# Patient Record
Sex: FEMALE | Race: WHITE | NOT HISPANIC OR LATINO | Employment: OTHER | URBAN - METROPOLITAN AREA
[De-identification: names, ages, dates, MRNs, and addresses within clinical notes are randomized per-mention and may not be internally consistent; named-entity substitution may affect disease eponyms.]

---

## 2017-01-11 ENCOUNTER — HISTORICAL (OUTPATIENT)
Dept: LAB | Facility: HOSPITAL | Age: 77
End: 2017-01-11

## 2017-03-03 ENCOUNTER — HISTORICAL (OUTPATIENT)
Dept: LAB | Facility: HOSPITAL | Age: 77
End: 2017-03-03

## 2017-04-03 ENCOUNTER — HISTORICAL (OUTPATIENT)
Dept: LAB | Facility: HOSPITAL | Age: 77
End: 2017-04-03

## 2017-05-09 ENCOUNTER — HISTORICAL (OUTPATIENT)
Dept: LAB | Facility: HOSPITAL | Age: 77
End: 2017-05-09

## 2017-05-09 LAB
ABS NEUT (OLG): 5.04
ALBUMIN SERPL-MCNC: 3.6 GM/DL (ref 3.4–5)
ALBUMIN/GLOB SERPL: 1 RATIO (ref 1.1–2)
ALP SERPL-CCNC: 83 UNIT/L (ref 50–136)
ALT SERPL-CCNC: 26 UNIT/L (ref 12–78)
AST SERPL-CCNC: 19 UNIT/L (ref 10–37)
BASOPHILS # BLD AUTO: 0.02 X10(3)/MCL
BASOPHILS NFR BLD AUTO: 0.2 %
BILIRUB SERPL-MCNC: 0.4 MG/DL (ref 0.2–1)
BILIRUBIN DIRECT+TOT PNL SERPL-MCNC: 0.14 MG/DL (ref 0.05–0.2)
BILIRUBIN DIRECT+TOT PNL SERPL-MCNC: 0.26 MG/DL
BUN SERPL-MCNC: 20 MG/DL (ref 7–18)
CALCIUM SERPL-MCNC: 9.5 MG/DL (ref 8.5–10.1)
CHLORIDE SERPL-SCNC: 103 MMOL/L (ref 98–107)
CK SERPL-CCNC: 62 MG/DL (ref 26–308)
CO2 SERPL-SCNC: 31.1 MMOL/L (ref 21–32)
CREAT SERPL-MCNC: 0.83 MG/DL (ref 0.55–1.02)
EOSINOPHIL # BLD AUTO: 0.16 X10(3)/MCL
EOSINOPHIL NFR BLD AUTO: 1.9 %
ERYTHROCYTE [DISTWIDTH] IN BLOOD BY AUTOMATED COUNT: 15 %
GLOBULIN SER-MCNC: 3.6 GM/DL (ref 2.4–3.5)
GLUCOSE SERPL-MCNC: 95 MG/DL (ref 74–106)
HCT VFR BLD AUTO: 45.2 % (ref 34–46)
HGB BLD-MCNC: 14.8 GM/DL (ref 11.3–15.4)
IMM GRANULOCYTES # BLD AUTO: 0.02 10*3/UL (ref 0–0.1)
IMM GRANULOCYTES NFR BLD AUTO: 0.2 % (ref 0–1)
LYMPHOCYTES # BLD AUTO: 2.45 X10(3)/MCL
LYMPHOCYTES NFR BLD AUTO: 28.6 %
MCH RBC QN AUTO: 31.7 PG (ref 27–33)
MCHC RBC AUTO-ENTMCNC: 32.7 GM/DL (ref 32–35)
MCV RBC AUTO: 96.8 FL (ref 81–97)
MONOCYTES # BLD AUTO: 0.87 X10(3)/MCL
MONOCYTES NFR BLD AUTO: 10.2 %
NEUTROPHILS # BLD AUTO: 5.04 X10(3)/MCL
NEUTROPHILS NFR BLD AUTO: 58.9 %
PLATELET # BLD AUTO: 368 X10(3)/MCL (ref 151–368)
PMV BLD AUTO: 9 FL
POTASSIUM SERPL-SCNC: 5.4 MMOL/L (ref 3.5–5.1)
PROT SERPL-MCNC: 7.2 GM/DL (ref 6.4–8.2)
RBC # BLD AUTO: 4.67 X10(6)/MCL (ref 3.9–5)
SODIUM SERPL-SCNC: 138 MMOL/L (ref 136–145)
WBC # SPEC AUTO: 8.56 X10(3)/MCL (ref 3.4–9.2)

## 2017-05-18 ENCOUNTER — HISTORICAL (OUTPATIENT)
Dept: LAB | Facility: HOSPITAL | Age: 77
End: 2017-05-18

## 2017-05-18 LAB
ABS NEUT (OLG): 6.13
ALBUMIN SERPL-MCNC: 3.8 GM/DL (ref 3.4–5)
ALBUMIN/GLOB SERPL: 1 RATIO (ref 1.1–2)
ALP SERPL-CCNC: 92 UNIT/L (ref 50–136)
ALT SERPL-CCNC: 22 UNIT/L (ref 12–78)
AST SERPL-CCNC: 19 UNIT/L (ref 10–37)
BASOPHILS # BLD AUTO: 0.02 X10(3)/MCL
BASOPHILS NFR BLD AUTO: 0.2 %
BILIRUB SERPL-MCNC: 0.6 MG/DL (ref 0.2–1)
BILIRUBIN DIRECT+TOT PNL SERPL-MCNC: 0.17 MG/DL (ref 0.05–0.2)
BILIRUBIN DIRECT+TOT PNL SERPL-MCNC: 0.43 MG/DL
BUN SERPL-MCNC: 16 MG/DL (ref 7–18)
CALCIUM SERPL-MCNC: 9.2 MG/DL (ref 8.5–10.1)
CHLORIDE SERPL-SCNC: 103 MMOL/L (ref 98–107)
CO2 SERPL-SCNC: 29.2 MMOL/L (ref 21–32)
CREAT SERPL-MCNC: 0.81 MG/DL (ref 0.55–1.02)
EOSINOPHIL # BLD AUTO: 0.11 X10(3)/MCL
EOSINOPHIL NFR BLD AUTO: 1.2 %
ERYTHROCYTE [DISTWIDTH] IN BLOOD BY AUTOMATED COUNT: 15 %
EST. AVERAGE GLUCOSE BLD GHB EST-MCNC: 123 MG/DL
GLOBULIN SER-MCNC: 3.9 GM/DL (ref 2.4–3.5)
GLUCOSE SERPL-MCNC: 109 MG/DL (ref 74–106)
HBA1C MFR BLD: 5.9 % (ref 4.5–6.2)
HCT VFR BLD AUTO: 46.2 % (ref 34–46)
HGB BLD-MCNC: 15 GM/DL (ref 11.3–15.4)
IMM GRANULOCYTES # BLD AUTO: 0.02 10*3/UL (ref 0–0.1)
IMM GRANULOCYTES NFR BLD AUTO: 0.2 % (ref 0–1)
LYMPHOCYTES # BLD AUTO: 2.06 X10(3)/MCL
LYMPHOCYTES NFR BLD AUTO: 22.6 %
MCH RBC QN AUTO: 31.4 PG (ref 27–33)
MCHC RBC AUTO-ENTMCNC: 32.5 GM/DL (ref 32–35)
MCV RBC AUTO: 96.7 FL (ref 81–97)
MONOCYTES # BLD AUTO: 0.78 X10(3)/MCL
MONOCYTES NFR BLD AUTO: 8.6 %
NEUTROPHILS # BLD AUTO: 6.13 X10(3)/MCL
NEUTROPHILS NFR BLD AUTO: 67.2 %
PLATELET # BLD AUTO: 363 X10(3)/MCL (ref 151–368)
PMV BLD AUTO: 9 FL
POTASSIUM SERPL-SCNC: 4.3 MMOL/L (ref 3.5–5.1)
PROT SERPL-MCNC: 7.7 GM/DL (ref 6.4–8.2)
RBC # BLD AUTO: 4.78 X10(6)/MCL (ref 3.9–5)
SODIUM SERPL-SCNC: 139 MMOL/L (ref 136–145)
TSH SERPL-ACNC: 1.72 MIU/ML (ref 0.35–3.75)
WBC # SPEC AUTO: 9.12 X10(3)/MCL (ref 3.4–9.2)

## 2017-10-23 ENCOUNTER — HISTORICAL (OUTPATIENT)
Dept: LAB | Facility: HOSPITAL | Age: 77
End: 2017-10-23

## 2017-10-23 LAB
EST. AVERAGE GLUCOSE BLD GHB EST-MCNC: 117 MG/DL
HBA1C MFR BLD: 5.7 % (ref 4.5–6.2)
T3FREE SERPL-MCNC: 3.34 PG/ML (ref 2.18–3.98)
T4 FREE SERPL-MCNC: 1.09 NG/DL (ref 0.76–1.46)
TSH SERPL-ACNC: 0.9 MIU/ML (ref 0.35–3.75)

## 2017-11-06 ENCOUNTER — HISTORICAL (OUTPATIENT)
Dept: LAB | Facility: HOSPITAL | Age: 77
End: 2017-11-06

## 2017-11-06 LAB
ABS NEUT (OLG): 6.03
ALBUMIN SERPL-MCNC: 3.4 GM/DL (ref 3.4–5)
ALBUMIN/GLOB SERPL: 0.9 RATIO (ref 1.1–2)
ALP SERPL-CCNC: 82 UNIT/L (ref 50–136)
ALT SERPL-CCNC: 22 UNIT/L (ref 12–78)
AST SERPL-CCNC: 15 UNIT/L (ref 10–37)
BASOPHILS # BLD AUTO: 0.02 X10(3)/MCL
BASOPHILS NFR BLD AUTO: 0.2 %
BILIRUB SERPL-MCNC: 0.2 MG/DL (ref 0.2–1)
BILIRUBIN DIRECT+TOT PNL SERPL-MCNC: 0.07 MG/DL (ref 0.05–0.2)
BILIRUBIN DIRECT+TOT PNL SERPL-MCNC: 0.18 MG/DL
BUN SERPL-MCNC: 20 MG/DL (ref 7–18)
CALCIUM SERPL-MCNC: 8.7 MG/DL (ref 8.5–10.1)
CHLORIDE SERPL-SCNC: 101 MMOL/L (ref 98–107)
CO2 SERPL-SCNC: 28.3 MMOL/L (ref 21–32)
CREAT SERPL-MCNC: 0.79 MG/DL (ref 0.55–1.02)
EOSINOPHIL # BLD AUTO: 0.19 X10(3)/MCL
EOSINOPHIL NFR BLD AUTO: 1.9 %
ERYTHROCYTE [DISTWIDTH] IN BLOOD BY AUTOMATED COUNT: 14 %
GLOBULIN SER-MCNC: 3.6 GM/DL (ref 2.4–3.5)
GLUCOSE SERPL-MCNC: 93 MG/DL (ref 74–106)
HCT VFR BLD AUTO: 42.1 % (ref 34–46)
HGB BLD-MCNC: 14 GM/DL (ref 11.3–15.4)
IMM GRANULOCYTES # BLD AUTO: 0.01 10*3/UL (ref 0–0.1)
IMM GRANULOCYTES NFR BLD AUTO: 0.1 % (ref 0–1)
LYMPHOCYTES # BLD AUTO: 2.72 X10(3)/MCL
LYMPHOCYTES NFR BLD AUTO: 27.1 %
MCH RBC QN AUTO: 31.7 PG (ref 27–33)
MCHC RBC AUTO-ENTMCNC: 33.3 GM/DL (ref 32–35)
MCV RBC AUTO: 95.2 FL (ref 81–97)
MONOCYTES # BLD AUTO: 1.05 X10(3)/MCL
MONOCYTES NFR BLD AUTO: 10.5 %
NEUTROPHILS # BLD AUTO: 6.03 X10(3)/MCL
NEUTROPHILS NFR BLD AUTO: 60.2 %
PLATELET # BLD AUTO: 369 X10(3)/MCL (ref 151–368)
PMV BLD AUTO: 9 FL
POTASSIUM SERPL-SCNC: 4.1 MMOL/L (ref 3.5–5.1)
PROT SERPL-MCNC: 7 GM/DL (ref 6.4–8.2)
RBC # BLD AUTO: 4.42 X10(6)/MCL (ref 3.9–5)
SODIUM SERPL-SCNC: 137 MMOL/L (ref 136–145)
WBC # SPEC AUTO: 10.02 X10(3)/MCL (ref 3.4–9.2)

## 2018-03-26 ENCOUNTER — HISTORICAL (OUTPATIENT)
Dept: LAB | Facility: HOSPITAL | Age: 78
End: 2018-03-26

## 2018-03-26 LAB
ABS NEUT (OLG): 5.59
ALBUMIN SERPL-MCNC: 3.6 GM/DL (ref 3.4–5)
ALBUMIN/GLOB SERPL: 0.9 RATIO (ref 1.1–2)
ALP SERPL-CCNC: 83 UNIT/L (ref 46–116)
ALT SERPL-CCNC: 25 UNIT/L (ref 12–78)
AST SERPL-CCNC: 17 UNIT/L (ref 10–37)
BASOPHILS # BLD AUTO: 0.02 X10(3)/MCL
BASOPHILS NFR BLD AUTO: 0.2 %
BILIRUB SERPL-MCNC: 0.5 MG/DL (ref 0.2–1)
BILIRUBIN DIRECT+TOT PNL SERPL-MCNC: 0.14 MG/DL (ref 0–0.2)
BILIRUBIN DIRECT+TOT PNL SERPL-MCNC: 0.36 MG/DL
BUN SERPL-MCNC: 20 MG/DL (ref 7–18)
CALCIUM SERPL-MCNC: 9.1 MG/DL (ref 8.5–10.1)
CHLORIDE SERPL-SCNC: 100 MMOL/L (ref 98–107)
CO2 SERPL-SCNC: 28.8 MMOL/L (ref 21–32)
CREAT SERPL-MCNC: 0.73 MG/DL (ref 0.55–1.02)
EOSINOPHIL # BLD AUTO: 0.13 X10(3)/MCL
EOSINOPHIL NFR BLD AUTO: 1.5 %
ERYTHROCYTE [DISTWIDTH] IN BLOOD BY AUTOMATED COUNT: 14 %
GLOBULIN SER-MCNC: 3.8 GM/DL (ref 2.4–3.5)
GLUCOSE SERPL-MCNC: 105 MG/DL (ref 74–106)
HCT VFR BLD AUTO: 45.2 % (ref 34–46)
HGB BLD-MCNC: 14.8 GM/DL (ref 11.3–15.4)
IMM GRANULOCYTES # BLD AUTO: 0.02 10*3/UL (ref 0–0.1)
IMM GRANULOCYTES NFR BLD AUTO: 0.2 % (ref 0–1)
LYMPHOCYTES # BLD AUTO: 2.26 X10(3)/MCL
LYMPHOCYTES NFR BLD AUTO: 25.2 %
MCH RBC QN AUTO: 31.6 PG (ref 27–33)
MCHC RBC AUTO-ENTMCNC: 32.7 GM/DL (ref 32–35)
MCV RBC AUTO: 96.6 FL (ref 81–97)
MONOCYTES # BLD AUTO: 0.94 X10(3)/MCL
MONOCYTES NFR BLD AUTO: 10.5 %
NEUTROPHILS # BLD AUTO: 5.59 X10(3)/MCL
NEUTROPHILS NFR BLD AUTO: 62.4 %
PLATELET # BLD AUTO: 351 X10(3)/MCL (ref 151–368)
PMV BLD AUTO: 9 FL
POTASSIUM SERPL-SCNC: 4.8 MMOL/L (ref 3.5–5.1)
PROT SERPL-MCNC: 7.4 GM/DL (ref 6.4–8.2)
RBC # BLD AUTO: 4.68 X10(6)/MCL (ref 3.9–5)
SODIUM SERPL-SCNC: 137 MMOL/L (ref 136–145)
WBC # SPEC AUTO: 8.96 X10(3)/MCL (ref 3.4–9.2)

## 2018-05-21 ENCOUNTER — HISTORICAL (OUTPATIENT)
Dept: LAB | Facility: HOSPITAL | Age: 78
End: 2018-05-21

## 2018-05-21 LAB
ABS NEUT (OLG): 4.37
ALBUMIN SERPL-MCNC: 3.7 GM/DL (ref 3.4–5)
ALBUMIN/GLOB SERPL: 0.9 RATIO (ref 1.1–2)
ALP SERPL-CCNC: 91 UNIT/L (ref 46–116)
ALT SERPL-CCNC: 26 UNIT/L (ref 12–78)
AST SERPL-CCNC: 19 UNIT/L (ref 10–37)
BASOPHILS # BLD AUTO: 0.03 X10(3)/MCL
BASOPHILS NFR BLD AUTO: 0.4 %
BILIRUB SERPL-MCNC: 0.5 MG/DL (ref 0.2–1)
BILIRUBIN DIRECT+TOT PNL SERPL-MCNC: 0.12 MG/DL (ref 0–0.2)
BILIRUBIN DIRECT+TOT PNL SERPL-MCNC: 0.38 MG/DL
BUN SERPL-MCNC: 14 MG/DL (ref 7–18)
CALCIUM SERPL-MCNC: 9.3 MG/DL (ref 8.5–10.1)
CHLORIDE SERPL-SCNC: 100 MMOL/L (ref 98–107)
CHOLEST SERPL-MCNC: 231 MG/DL (ref 50–200)
CHOLEST/HDLC SERPL: 3 {RATIO} (ref 0–5)
CO2 SERPL-SCNC: 31.5 MMOL/L (ref 21–32)
CREAT SERPL-MCNC: 0.84 MG/DL (ref 0.55–1.02)
EOSINOPHIL # BLD AUTO: 0.16 X10(3)/MCL
EOSINOPHIL NFR BLD AUTO: 2 %
ERYTHROCYTE [DISTWIDTH] IN BLOOD BY AUTOMATED COUNT: 14 %
EST. AVERAGE GLUCOSE BLD GHB EST-MCNC: 117 MG/DL
GLOBULIN SER-MCNC: 4.1 GM/DL (ref 2.4–3.5)
GLUCOSE SERPL-MCNC: 104 MG/DL (ref 74–106)
HBA1C MFR BLD: 5.7 % (ref 4.5–6.2)
HCT VFR BLD AUTO: 47.5 % (ref 34–46)
HDLC SERPL-MCNC: 74 MG/DL (ref 35–60)
HGB BLD-MCNC: 15.6 GM/DL (ref 11.3–15.4)
IMM GRANULOCYTES # BLD AUTO: 0.02 10*3/UL (ref 0–0.1)
IMM GRANULOCYTES NFR BLD AUTO: 0.3 % (ref 0–1)
LDLC SERPL CALC-MCNC: 143 MG/DL (ref 50–140)
LYMPHOCYTES # BLD AUTO: 2.42 X10(3)/MCL
LYMPHOCYTES NFR BLD AUTO: 30.8 %
MCH RBC QN AUTO: 31.2 PG (ref 27–33)
MCHC RBC AUTO-ENTMCNC: 32.8 GM/DL (ref 32–35)
MCV RBC AUTO: 95 FL (ref 81–97)
MONOCYTES # BLD AUTO: 0.86 X10(3)/MCL
MONOCYTES NFR BLD AUTO: 10.9 %
NEUTROPHILS # BLD AUTO: 4.37 X10(3)/MCL
NEUTROPHILS NFR BLD AUTO: 55.6 %
PLATELET # BLD AUTO: 354 X10(3)/MCL (ref 151–368)
PMV BLD AUTO: 9 FL
POTASSIUM SERPL-SCNC: 5.1 MMOL/L (ref 3.5–5.1)
PROT SERPL-MCNC: 7.8 GM/DL (ref 6.4–8.2)
RBC # BLD AUTO: 5 X10(6)/MCL (ref 3.9–5)
SODIUM SERPL-SCNC: 136 MMOL/L (ref 136–145)
T3FREE SERPL-MCNC: 2.74 PG/ML (ref 2.18–3.98)
T4 FREE SERPL-MCNC: 0.96 NG/DL (ref 0.76–4.46)
TRIGL SERPL-MCNC: 68 MG/DL (ref 30–150)
TSH SERPL-ACNC: 1.94 MIU/ML (ref 0.35–3.75)
VLDLC SERPL CALC-MCNC: 14 MG/DL
WBC # SPEC AUTO: 7.86 X10(3)/MCL (ref 3.4–9.2)

## 2018-12-11 ENCOUNTER — HISTORICAL (OUTPATIENT)
Dept: LAB | Facility: HOSPITAL | Age: 78
End: 2018-12-11

## 2018-12-11 LAB
ABS NEUT (OLG): 6.32
ALBUMIN SERPL-MCNC: 3.8 GM/DL (ref 3.4–5)
ALBUMIN/GLOB SERPL: 1 RATIO (ref 1.1–2)
ALP SERPL-CCNC: 98 UNIT/L (ref 46–116)
ALT SERPL-CCNC: 27 UNIT/L (ref 12–78)
AST SERPL-CCNC: 21 UNIT/L (ref 10–37)
BASOPHILS # BLD AUTO: 0.02 X10(3)/MCL
BASOPHILS NFR BLD AUTO: 0.2 %
BILIRUB SERPL-MCNC: 0.4 MG/DL (ref 0.2–1)
BILIRUBIN DIRECT+TOT PNL SERPL-MCNC: 0.14 MG/DL (ref 0–0.2)
BILIRUBIN DIRECT+TOT PNL SERPL-MCNC: 0.26 MG/DL
BUN SERPL-MCNC: 14 MG/DL (ref 7–18)
CALCIUM SERPL-MCNC: 9.2 MG/DL (ref 8.5–10.1)
CHLORIDE SERPL-SCNC: 101 MMOL/L (ref 98–107)
CK SERPL-CCNC: 62 MG/DL (ref 26–308)
CO2 SERPL-SCNC: 28.2 MMOL/L (ref 21–32)
CREAT SERPL-MCNC: 0.74 MG/DL (ref 0.55–1.02)
EOSINOPHIL # BLD AUTO: 0.24 X10(3)/MCL
EOSINOPHIL NFR BLD AUTO: 2.5 %
ERYTHROCYTE [DISTWIDTH] IN BLOOD BY AUTOMATED COUNT: 14 %
GLOBULIN SER-MCNC: 3.7 GM/DL (ref 2.4–3.5)
GLUCOSE SERPL-MCNC: 110 MG/DL (ref 74–106)
HCT VFR BLD AUTO: 46.7 % (ref 34–46)
HGB BLD-MCNC: 15.1 GM/DL (ref 11.3–15.4)
IMM GRANULOCYTES # BLD AUTO: 0.02 10*3/UL (ref 0–0.1)
IMM GRANULOCYTES NFR BLD AUTO: 0.2 % (ref 0–1)
LYMPHOCYTES # BLD AUTO: 2.25 X10(3)/MCL
LYMPHOCYTES NFR BLD AUTO: 23.1 %
MCH RBC QN AUTO: 31.9 PG (ref 27–33)
MCHC RBC AUTO-ENTMCNC: 32.3 GM/DL (ref 32–35)
MCV RBC AUTO: 98.5 FL (ref 81–97)
MONOCYTES # BLD AUTO: 0.9 X10(3)/MCL
MONOCYTES NFR BLD AUTO: 9.2 %
NEUTROPHILS # BLD AUTO: 6.32 X10(3)/MCL
NEUTROPHILS NFR BLD AUTO: 64.8 %
PLATELET # BLD AUTO: 393 X10(3)/MCL (ref 151–368)
PMV BLD AUTO: 9 FL
POTASSIUM SERPL-SCNC: 4.8 MMOL/L (ref 3.5–5.1)
PROT SERPL-MCNC: 7.5 GM/DL (ref 6.4–8.2)
RBC # BLD AUTO: 4.74 X10(6)/MCL (ref 3.9–5)
SODIUM SERPL-SCNC: 137 MMOL/L (ref 136–145)
WBC # SPEC AUTO: 9.75 X10(3)/MCL (ref 3.4–9.2)

## 2019-02-14 ENCOUNTER — HISTORICAL (OUTPATIENT)
Dept: LAB | Facility: HOSPITAL | Age: 79
End: 2019-02-14

## 2019-02-14 LAB
ABS NEUT (OLG): 5.41
ALBUMIN SERPL-MCNC: 3.6 GM/DL (ref 3.4–5)
ALBUMIN/GLOB SERPL: 1 RATIO (ref 1.1–2)
ALP SERPL-CCNC: 81 UNIT/L (ref 46–116)
ALT SERPL-CCNC: 25 UNIT/L (ref 12–78)
AST SERPL-CCNC: 18 UNIT/L (ref 10–37)
BASOPHILS # BLD AUTO: 0.02 X10(3)/MCL
BASOPHILS NFR BLD AUTO: 0.2 %
BILIRUB SERPL-MCNC: 0.5 MG/DL (ref 0.2–1)
BILIRUBIN DIRECT+TOT PNL SERPL-MCNC: 0.14 MG/DL (ref 0–0.2)
BILIRUBIN DIRECT+TOT PNL SERPL-MCNC: 0.36 MG/DL
BUN SERPL-MCNC: 15 MG/DL (ref 7–18)
CALCIUM SERPL-MCNC: 9 MG/DL (ref 8.5–10.1)
CHLORIDE SERPL-SCNC: 99 MMOL/L (ref 98–107)
CK SERPL-CCNC: 66 MG/DL (ref 26–308)
CO2 SERPL-SCNC: 32.3 MMOL/L (ref 21–32)
CREAT SERPL-MCNC: 0.8 MG/DL (ref 0.55–1.02)
EOSINOPHIL # BLD AUTO: 0.17 X10(3)/MCL
EOSINOPHIL NFR BLD AUTO: 2 %
ERYTHROCYTE [DISTWIDTH] IN BLOOD BY AUTOMATED COUNT: 15 %
GLOBULIN SER-MCNC: 3.6 GM/DL (ref 2.4–3.5)
GLUCOSE SERPL-MCNC: 98 MG/DL (ref 74–106)
HCT VFR BLD AUTO: 47.5 % (ref 34–46)
HGB BLD-MCNC: 15.3 GM/DL (ref 11.3–15.4)
IMM GRANULOCYTES # BLD AUTO: 0.02 10*3/UL (ref 0–0.1)
IMM GRANULOCYTES NFR BLD AUTO: 0.2 % (ref 0–1)
LYMPHOCYTES # BLD AUTO: 2.17 X10(3)/MCL
LYMPHOCYTES NFR BLD AUTO: 25.5 %
MCH RBC QN AUTO: 31.4 PG (ref 27–33)
MCHC RBC AUTO-ENTMCNC: 32.2 GM/DL (ref 32–35)
MCV RBC AUTO: 97.5 FL (ref 81–97)
MONOCYTES # BLD AUTO: 0.73 X10(3)/MCL
MONOCYTES NFR BLD AUTO: 8.6 %
NEUTROPHILS # BLD AUTO: 5.41 X10(3)/MCL
NEUTROPHILS NFR BLD AUTO: 63.5 %
PLATELET # BLD AUTO: 378 X10(3)/MCL (ref 151–368)
PMV BLD AUTO: 9 FL
POTASSIUM SERPL-SCNC: 4.2 MMOL/L (ref 3.5–5.1)
PROT SERPL-MCNC: 7.2 GM/DL (ref 6.4–8.2)
RBC # BLD AUTO: 4.87 X10(6)/MCL (ref 3.9–5)
SODIUM SERPL-SCNC: 136 MMOL/L (ref 136–145)
WBC # SPEC AUTO: 8.52 X10(3)/MCL (ref 3.4–9.2)

## 2019-03-01 ENCOUNTER — HISTORICAL (OUTPATIENT)
Dept: LAB | Facility: HOSPITAL | Age: 79
End: 2019-03-01

## 2019-03-01 LAB
ABS NEUT (OLG): 5.8
ALBUMIN SERPL-MCNC: 3.5 GM/DL (ref 3.4–5)
ALBUMIN/GLOB SERPL: 1 RATIO (ref 1.1–2)
ALP SERPL-CCNC: 94 UNIT/L (ref 46–116)
ALT SERPL-CCNC: 23 UNIT/L (ref 12–78)
AST SERPL-CCNC: 19 UNIT/L (ref 10–37)
BASOPHILS # BLD AUTO: 0.02 X10(3)/MCL
BASOPHILS NFR BLD AUTO: 0.2 %
BILIRUB SERPL-MCNC: 0.5 MG/DL (ref 0.2–1)
BILIRUBIN DIRECT+TOT PNL SERPL-MCNC: 0.13 MG/DL (ref 0–0.2)
BILIRUBIN DIRECT+TOT PNL SERPL-MCNC: 0.37 MG/DL
BUN SERPL-MCNC: 14 MG/DL (ref 7–18)
CALCIUM SERPL-MCNC: 8.8 MG/DL (ref 8.5–10.1)
CHLORIDE SERPL-SCNC: 100 MMOL/L (ref 98–107)
CHOLEST SERPL-MCNC: 189 MG/DL (ref 50–200)
CHOLEST/HDLC SERPL: 3 {RATIO} (ref 0–5)
CO2 SERPL-SCNC: 28.6 MMOL/L (ref 21–32)
CREAT SERPL-MCNC: 0.8 MG/DL (ref 0.55–1.02)
EOSINOPHIL # BLD AUTO: 0.15 X10(3)/MCL
EOSINOPHIL NFR BLD AUTO: 1.7 %
ERYTHROCYTE [DISTWIDTH] IN BLOOD BY AUTOMATED COUNT: 15 %
EST. AVERAGE GLUCOSE BLD GHB EST-MCNC: 134 MG/DL
GLOBULIN SER-MCNC: 3.6 GM/DL (ref 2.4–3.5)
GLUCOSE SERPL-MCNC: 102 MG/DL (ref 74–106)
HBA1C MFR BLD: 6.3 % (ref 4.5–6.2)
HCT VFR BLD AUTO: 45.9 % (ref 34–46)
HDLC SERPL-MCNC: 67 MG/DL (ref 35–60)
HGB BLD-MCNC: 14.8 GM/DL (ref 11.3–15.4)
IMM GRANULOCYTES # BLD AUTO: 0.01 10*3/UL (ref 0–0.1)
IMM GRANULOCYTES NFR BLD AUTO: 0.1 % (ref 0–1)
LDLC SERPL CALC-MCNC: 108 MG/DL (ref 50–140)
LYMPHOCYTES # BLD AUTO: 2.09 X10(3)/MCL
LYMPHOCYTES NFR BLD AUTO: 23.4 %
MCH RBC QN AUTO: 31.7 PG (ref 27–33)
MCHC RBC AUTO-ENTMCNC: 32.2 GM/DL (ref 32–35)
MCV RBC AUTO: 98.3 FL (ref 81–97)
MONOCYTES # BLD AUTO: 0.85 X10(3)/MCL
MONOCYTES NFR BLD AUTO: 9.5 %
NEUTROPHILS # BLD AUTO: 5.8 X10(3)/MCL
NEUTROPHILS NFR BLD AUTO: 65.1 %
PLATELET # BLD AUTO: 356 X10(3)/MCL (ref 151–368)
PMV BLD AUTO: 9 FL
POTASSIUM SERPL-SCNC: 4.2 MMOL/L (ref 3.5–5.1)
PROT SERPL-MCNC: 7.1 GM/DL (ref 6.4–8.2)
RBC # BLD AUTO: 4.67 X10(6)/MCL (ref 3.9–5)
SODIUM SERPL-SCNC: 136 MMOL/L (ref 136–145)
T3FREE SERPL-MCNC: 3.82 PG/ML (ref 2.18–3.98)
T4 FREE SERPL-MCNC: 1.22 NG/DL (ref 0.76–1.46)
TRIGL SERPL-MCNC: 68 MG/DL (ref 30–150)
TSH SERPL-ACNC: 1.4 MIU/ML (ref 0.35–3.75)
VLDLC SERPL CALC-MCNC: 14 MG/DL
WBC # SPEC AUTO: 8.92 X10(3)/MCL (ref 3.4–9.2)

## 2019-05-02 ENCOUNTER — HISTORICAL (OUTPATIENT)
Dept: LAB | Facility: HOSPITAL | Age: 79
End: 2019-05-02

## 2019-05-02 LAB
ABS NEUT (OLG): 4.2
ALBUMIN SERPL-MCNC: 3.6 GM/DL (ref 3.4–5)
ALBUMIN/GLOB SERPL: 1.1 RATIO (ref 1.1–2)
ALP SERPL-CCNC: 87 UNIT/L (ref 46–116)
ALT SERPL-CCNC: 20 UNIT/L (ref 12–78)
AST SERPL-CCNC: 16 UNIT/L (ref 10–37)
BASOPHILS # BLD AUTO: 0.02 X10(3)/MCL
BASOPHILS NFR BLD AUTO: 0.3 %
BILIRUB SERPL-MCNC: 0.4 MG/DL (ref 0.2–1)
BILIRUBIN DIRECT+TOT PNL SERPL-MCNC: 0.13 MG/DL (ref 0–0.2)
BILIRUBIN DIRECT+TOT PNL SERPL-MCNC: 0.27 MG/DL
BUN SERPL-MCNC: 16 MG/DL (ref 7–18)
CALCIUM SERPL-MCNC: 8.7 MG/DL (ref 8.5–10.1)
CHLORIDE SERPL-SCNC: 99 MMOL/L (ref 98–107)
CK SERPL-CCNC: 72 MG/DL (ref 26–308)
CO2 SERPL-SCNC: 28.1 MMOL/L (ref 21–32)
CREAT SERPL-MCNC: 0.96 MG/DL (ref 0.55–1.02)
EOSINOPHIL # BLD AUTO: 0.16 X10(3)/MCL
EOSINOPHIL NFR BLD AUTO: 2 %
ERYTHROCYTE [DISTWIDTH] IN BLOOD BY AUTOMATED COUNT: 14 %
GLOBULIN SER-MCNC: 3.3 GM/DL (ref 2.4–3.5)
GLUCOSE SERPL-MCNC: 91 MG/DL (ref 74–106)
HCT VFR BLD AUTO: 44.3 % (ref 34–46)
HGB BLD-MCNC: 14.2 GM/DL (ref 11.3–15.4)
IMM GRANULOCYTES # BLD AUTO: 0.03 10*3/UL (ref 0–0.1)
IMM GRANULOCYTES NFR BLD AUTO: 0.4 % (ref 0–1)
LYMPHOCYTES # BLD AUTO: 2.52 X10(3)/MCL
LYMPHOCYTES NFR BLD AUTO: 32.1 %
MCH RBC QN AUTO: 31.8 PG (ref 27–33)
MCHC RBC AUTO-ENTMCNC: 32.1 GM/DL (ref 32–35)
MCV RBC AUTO: 99.3 FL (ref 81–97)
MONOCYTES # BLD AUTO: 0.92 X10(3)/MCL
MONOCYTES NFR BLD AUTO: 11.7 %
NEUTROPHILS # BLD AUTO: 4.2 X10(3)/MCL
NEUTROPHILS NFR BLD AUTO: 53.5 %
PLATELET # BLD AUTO: 368 X10(3)/MCL (ref 151–368)
PMV BLD AUTO: 9 FL
POTASSIUM SERPL-SCNC: 4 MMOL/L (ref 3.5–5.1)
PROT SERPL-MCNC: 6.9 GM/DL (ref 6.4–8.2)
RBC # BLD AUTO: 4.46 X10(6)/MCL (ref 3.9–5)
SODIUM SERPL-SCNC: 135 MMOL/L (ref 136–145)
WBC # SPEC AUTO: 7.85 X10(3)/MCL (ref 3.4–9.2)

## 2019-08-26 ENCOUNTER — HISTORICAL (OUTPATIENT)
Dept: LAB | Facility: HOSPITAL | Age: 79
End: 2019-08-26

## 2019-08-26 LAB
ABS NEUT (OLG): 5.41
ALBUMIN SERPL-MCNC: 3.5 GM/DL (ref 3.4–5)
ALBUMIN/GLOB SERPL: 0.9 RATIO (ref 1.1–2)
ALP SERPL-CCNC: 89 UNIT/L (ref 46–116)
ALT SERPL-CCNC: 20 UNIT/L (ref 12–78)
AST SERPL-CCNC: 19 UNIT/L (ref 10–37)
BASOPHILS # BLD AUTO: 0.03 X10(3)/MCL
BASOPHILS NFR BLD AUTO: 0.3 %
BILIRUB SERPL-MCNC: 0.4 MG/DL (ref 0.2–1)
BILIRUBIN DIRECT+TOT PNL SERPL-MCNC: 0.12 MG/DL (ref 0–0.2)
BILIRUBIN DIRECT+TOT PNL SERPL-MCNC: 0.28 MG/DL
BUN SERPL-MCNC: 19 MG/DL (ref 7–18)
CALCIUM SERPL-MCNC: 9.2 MG/DL (ref 8.5–10.1)
CHLORIDE SERPL-SCNC: 102 MMOL/L (ref 98–107)
CK SERPL-CCNC: 57 MG/DL (ref 26–308)
CO2 SERPL-SCNC: 30.2 MMOL/L (ref 21–32)
CREAT SERPL-MCNC: 0.82 MG/DL (ref 0.55–1.02)
EOSINOPHIL # BLD AUTO: 0.23 X10(3)/MCL
EOSINOPHIL NFR BLD AUTO: 2.4 %
ERYTHROCYTE [DISTWIDTH] IN BLOOD BY AUTOMATED COUNT: 14 %
GLOBULIN SER-MCNC: 3.7 GM/DL (ref 2.4–3.5)
GLUCOSE SERPL-MCNC: 93 MG/DL (ref 74–106)
HCT VFR BLD AUTO: 45.6 % (ref 34–46)
HGB BLD-MCNC: 14.9 GM/DL (ref 11.3–15.4)
IMM GRANULOCYTES # BLD AUTO: 0.02 10*3/UL (ref 0–0.1)
IMM GRANULOCYTES NFR BLD AUTO: 0.2 % (ref 0–1)
LYMPHOCYTES # BLD AUTO: 2.94 X10(3)/MCL
LYMPHOCYTES NFR BLD AUTO: 30.4 %
MCH RBC QN AUTO: 31.7 PG (ref 27–33)
MCHC RBC AUTO-ENTMCNC: 32.7 GM/DL (ref 32–35)
MCV RBC AUTO: 97 FL (ref 81–97)
MONOCYTES # BLD AUTO: 1.05 X10(3)/MCL
MONOCYTES NFR BLD AUTO: 10.8 %
NEUTROPHILS # BLD AUTO: 5.41 X10(3)/MCL
NEUTROPHILS NFR BLD AUTO: 55.9 %
PLATELET # BLD AUTO: 341 X10(3)/MCL (ref 140–450)
PMV BLD AUTO: 10 FL
POTASSIUM SERPL-SCNC: 5.4 MMOL/L (ref 3.5–5.1)
PROT SERPL-MCNC: 7.2 GM/DL (ref 6.4–8.2)
RBC # BLD AUTO: 4.7 X10(6)/MCL (ref 3.9–5)
SODIUM SERPL-SCNC: 139 MMOL/L (ref 136–145)
WBC # SPEC AUTO: 9.68 X10(3)/MCL (ref 3.4–9.2)

## 2019-12-05 ENCOUNTER — HISTORICAL (OUTPATIENT)
Dept: LAB | Facility: HOSPITAL | Age: 79
End: 2019-12-05

## 2019-12-05 LAB
ALBUMIN SERPL-MCNC: 3.6 GM/DL (ref 3.2–4.6)
ALBUMIN/GLOB SERPL: 1 RATIO (ref 1.1–2)
ALP SERPL-CCNC: 86 UNIT/L (ref 40–150)
ALT SERPL-CCNC: 15 UNIT/L (ref 0–55)
AST SERPL-CCNC: 19 UNIT/L (ref 5–34)
BILIRUB SERPL-MCNC: 0.6 MG/DL
BILIRUBIN DIRECT+TOT PNL SERPL-MCNC: 0.3 MG/DL
BILIRUBIN DIRECT+TOT PNL SERPL-MCNC: 0.3 MG/DL (ref 0–0.5)
BUN SERPL-MCNC: 12 MG/DL (ref 9.8–20.1)
CALCIUM SERPL-MCNC: 9.9 MG/DL (ref 8.4–10.2)
CHLORIDE SERPL-SCNC: 99 MMOL/L (ref 98–107)
CO2 SERPL-SCNC: 27 MEQ/L (ref 23–31)
CREAT SERPL-MCNC: 0.79 MG/DL (ref 0.55–1.02)
EST. AVERAGE GLUCOSE BLD GHB EST-MCNC: 120 MG/DL
GLOBULIN SER-MCNC: 3.7 GM/DL (ref 2.4–3.5)
GLUCOSE SERPL-MCNC: 113 MG/DL (ref 82–115)
HBA1C MFR BLD: 5.8 % (ref 4–6)
POTASSIUM SERPL-SCNC: 4.8 MMOL/L (ref 3.5–5.1)
PROT SERPL-MCNC: 7.3 GM/DL (ref 5.8–7.6)
SODIUM SERPL-SCNC: 135 MMOL/L (ref 136–145)
T3FREE SERPL-MCNC: 3.95 PG/ML (ref 2.18–3.98)
T4 FREE SERPL-MCNC: 1.17 NG/DL (ref 0.76–1.46)
TSH SERPL-ACNC: 2.14 UIU/ML (ref 0.35–4.94)

## 2023-04-05 ENCOUNTER — OFFICE VISIT (OUTPATIENT)
Dept: PODIATRY | Facility: CLINIC | Age: 83
End: 2023-04-05
Payer: MEDICARE

## 2023-04-05 VITALS — HEIGHT: 64 IN | WEIGHT: 138 LBS | BODY MASS INDEX: 23.56 KG/M2

## 2023-04-05 DIAGNOSIS — M79.675 TOE PAIN, BILATERAL: Primary | ICD-10-CM

## 2023-04-05 DIAGNOSIS — M79.674 TOE PAIN, BILATERAL: Primary | ICD-10-CM

## 2023-04-05 DIAGNOSIS — B35.1 DERMATOPHYTOSIS OF NAIL: ICD-10-CM

## 2023-04-05 PROCEDURE — 1159F MED LIST DOCD IN RCRD: CPT | Mod: CPTII,S$GLB,, | Performed by: PODIATRIST

## 2023-04-05 PROCEDURE — 99203 PR OFFICE/OUTPT VISIT, NEW, LEVL III, 30-44 MIN: ICD-10-PCS | Mod: 25,S$GLB,, | Performed by: PODIATRIST

## 2023-04-05 PROCEDURE — 1101F PR PT FALLS ASSESS DOC 0-1 FALLS W/OUT INJ PAST YR: ICD-10-PCS | Mod: CPTII,S$GLB,, | Performed by: PODIATRIST

## 2023-04-05 PROCEDURE — 1126F AMNT PAIN NOTED NONE PRSNT: CPT | Mod: CPTII,S$GLB,, | Performed by: PODIATRIST

## 2023-04-05 PROCEDURE — 11721 DEBRIDE NAIL 6 OR MORE: CPT | Mod: S$GLB,,, | Performed by: PODIATRIST

## 2023-04-05 PROCEDURE — 1101F PT FALLS ASSESS-DOCD LE1/YR: CPT | Mod: CPTII,S$GLB,, | Performed by: PODIATRIST

## 2023-04-05 PROCEDURE — 1126F PR PAIN SEVERITY QUANTIFIED, NO PAIN PRESENT: ICD-10-PCS | Mod: CPTII,S$GLB,, | Performed by: PODIATRIST

## 2023-04-05 PROCEDURE — 3288F PR FALLS RISK ASSESSMENT DOCUMENTED: ICD-10-PCS | Mod: CPTII,S$GLB,, | Performed by: PODIATRIST

## 2023-04-05 PROCEDURE — 99999 PR PBB SHADOW E&M-NEW PATIENT-LVL II: CPT | Mod: PBBFAC,,, | Performed by: PODIATRIST

## 2023-04-05 PROCEDURE — 11721 PR DEBRIDEMENT OF NAILS, 6 OR MORE: ICD-10-PCS | Mod: S$GLB,,, | Performed by: PODIATRIST

## 2023-04-05 PROCEDURE — 99203 OFFICE O/P NEW LOW 30 MIN: CPT | Mod: 25,S$GLB,, | Performed by: PODIATRIST

## 2023-04-05 PROCEDURE — 3288F FALL RISK ASSESSMENT DOCD: CPT | Mod: CPTII,S$GLB,, | Performed by: PODIATRIST

## 2023-04-05 PROCEDURE — 99999 PR PBB SHADOW E&M-NEW PATIENT-LVL II: ICD-10-PCS | Mod: PBBFAC,,, | Performed by: PODIATRIST

## 2023-04-05 PROCEDURE — 1159F PR MEDICATION LIST DOCUMENTED IN MEDICAL RECORD: ICD-10-PCS | Mod: CPTII,S$GLB,, | Performed by: PODIATRIST

## 2023-04-05 RX ORDER — OLMESARTAN MEDOXOMIL 5 MG/1
TABLET ORAL
COMMUNITY
Start: 2022-11-10

## 2023-04-05 RX ORDER — METFORMIN HYDROCHLORIDE 500 MG/1
500 TABLET ORAL
COMMUNITY
Start: 2022-10-03

## 2023-04-05 RX ORDER — CITALOPRAM 20 MG/1
1 TABLET, FILM COATED ORAL DAILY
COMMUNITY
Start: 2022-04-14 | End: 2023-04-14

## 2023-04-05 RX ORDER — LEVOTHYROXINE SODIUM 50 UG/1
50 TABLET ORAL
COMMUNITY
Start: 2023-04-04

## 2023-04-05 RX ORDER — LIOTHYRONINE SODIUM 5 UG/1
5 TABLET ORAL
COMMUNITY
Start: 2023-04-04

## 2023-04-05 NOTE — PROGRESS NOTES
PODIATRIC MEDICINE AND SURGERY   Primary Doctor No    CHIEF COMPLAINT  Chief Complaint   Patient presents with    Nail Care     Nail Care, non diabetic wears casual shoes and stockings, Last seen PCP Dr. Donna Carmen 02/01/2023         HPI    SUBJECTIVE: Zohra Pickett is a 82 y.o. female who  has a past medical history of Hypothyroidism. Zohra presenting to podiatry clinic with complaint of thickened, discolored, and painful toenails. Pt states nails result in pain with enclosed shoe wear aggravated due to pressure  and/or with ambulation. They are unable to trim nails. They are requesting nail care for relief of painful symptoms. Patient has no further pedal complaints.      PMH  Past Medical History:   Diagnosis Date    Hypothyroidism      There is no problem list on file for this patient.      MEDS  Current Outpatient Medications on File Prior to Visit   Medication Sig Dispense Refill    calcium carbonate-magnesium hydroxide (ROLAIDS) 550-110 mg Chew Take 1 tablet by mouth.      citalopram (CELEXA) 20 MG tablet Take 1 tablet by mouth once daily.      liothyronine (CYTOMEL) 5 MCG Tab Take 5 mcg by mouth.      metFORMIN (GLUCOPHAGE) 500 MG tablet Take 500 mg by mouth.      olmesartan (BENICAR) 5 MG Tab Take by mouth.      SYNTHROID 50 mcg tablet Take 50 mcg by mouth.       No current facility-administered medications on file prior to visit.       PSH   History reviewed. No pertinent surgical history.     ALL  Review of patient's allergies indicates:   Allergen Reactions    Egg derived Anaphylaxis    Ciprofloxacin Other (See Comments)    Pravastatin Other (See Comments)     DVT       SOC     Social History     Tobacco Use    Smoking status: Never    Smokeless tobacco: Never         Family HX  History reviewed. No pertinent family history.         REVIEW OF SYSTEMS  General: Denies any fever or chills  Chest: Denies shortness of breath, wheezing, coughing, or sputum production  Heart: Denies chest pain.  As noted  "above and per history of current illness above, otherwise negative in the remainder of the 14 systems.     PHYSICAL EXAM  Vitals:    04/05/23 1517   Weight: 62.6 kg (138 lb)   Height: 5' 4" (1.626 m)   PainSc: 0-No pain       GEN:  This patient is well-developed, well-nourished and appears stated age, well-oriented to person, place and time, and cooperative and pleasant on today's visit.      LOWER EXTREMITY    VASCULAR  DP pedal pulse 2/4 RIGHT, LEFT2/4   PT pedal pulse 2/4 RIGHT, LEFT2/4  Capillary refill time immediate to the toes.   Feet are warm to the touch. Skin temperature warm to warm from proximally to distally     DERMATOLOGIC  Skin moist with healthy texture and turgor.  Thickened, dystrophic, elongated, discolored toenails with subungal debris 1,2, 3, 4, 5 RIGHT FOOT, 1, 2, 3, 4 , 5 LEFT FOOT   There are no open ulcerations, lacerations, or fissures to bilateral foot and ankle regions. There are no signs of infection as there is no erythema, no proximal-extending lymphangiitis, no fluctuance, or crepitus noted on palpation to bilateral foot and ankle regions.   There is no interdigital maceration.   There are no hyperkeratotic lesions noted to feet.    NEUROLOGIC  Neurological sensation is grossly intact to all sites tested    ORTHOPEDIC/BIOMECHANICAL  No symptomatic structural abnormalities noted. Muscle strength is 5/5 for foot inverters, everters, plantarflexors, and dorsiflexors. Muscle tone is normal.   Inspection/palpation of bone, joints and muscles unremarkable.    ASSESSMENT  Toe pain, bilateral    Dermatophytosis of nail        PLAN  -The patient was examined and evaulated. Patient was given verbal instructions on maintenance care for mycotic nails. The need for proper skin care in order to prevent infection and colonization in the nails was explained to the patient.    - The nails are painful with applied pressure and shoe gear. If a mycotic infection is left untreated, the infection could " spread, causing marked limitation of ambulation, and/or make the patient prone to secondary infection. Failure to debride the nails at necessary intervals could likely cause recurrence of pain.   -I recommend Vicks vapor topically once daily to affected toenails. This is a natural antifungal regimen that will help in reduction of fungal load and will also soften the nail to allow for easier debridements.   -With patient's permission, the elongated onychomycotic toenails, as outlined in the physical examination, were sharply debrided with a double action nail nipper to their soft tissue attachment. If indicated, the nails were then smoothed down in thickness with an anderson board to facilitate in further debridement removing all offending nail and subungual debris. Patient relates relief following the procedure.       Disclaimer: This note was partially prepared using a voice recognition system and is likely to have sound alike errors within the text.        No future appointments.    Report Electronically Signed By:     Shonna Medina DPM   Podiatry  [facility] [place]  4/11/2023

## 2024-03-11 ENCOUNTER — OFFICE VISIT (OUTPATIENT)
Dept: PODIATRY | Facility: CLINIC | Age: 84
End: 2024-03-11
Payer: MEDICARE

## 2024-03-11 VITALS — BODY MASS INDEX: 23.56 KG/M2 | WEIGHT: 138 LBS | HEIGHT: 64 IN

## 2024-03-11 DIAGNOSIS — M21.622 TAILOR'S BUNIONETTE, LEFT: Primary | ICD-10-CM

## 2024-03-11 PROCEDURE — 99999 PR PBB SHADOW E&M-EST. PATIENT-LVL III: CPT | Mod: PBBFAC,,, | Performed by: PODIATRIST

## 2024-03-11 PROCEDURE — 3288F FALL RISK ASSESSMENT DOCD: CPT | Mod: CPTII,S$GLB,, | Performed by: PODIATRIST

## 2024-03-11 PROCEDURE — 1159F MED LIST DOCD IN RCRD: CPT | Mod: CPTII,S$GLB,, | Performed by: PODIATRIST

## 2024-03-11 PROCEDURE — 99213 OFFICE O/P EST LOW 20 MIN: CPT | Mod: S$GLB,,, | Performed by: PODIATRIST

## 2024-03-11 PROCEDURE — 1125F AMNT PAIN NOTED PAIN PRSNT: CPT | Mod: CPTII,S$GLB,, | Performed by: PODIATRIST

## 2024-03-11 PROCEDURE — 1101F PT FALLS ASSESS-DOCD LE1/YR: CPT | Mod: CPTII,S$GLB,, | Performed by: PODIATRIST

## 2024-03-11 RX ORDER — MELOXICAM 7.5 MG/1
7.5 TABLET ORAL DAILY
Qty: 10 TABLET | Refills: 0 | Status: SHIPPED | OUTPATIENT
Start: 2024-03-11 | End: 2024-03-21

## 2024-03-11 NOTE — PROGRESS NOTES
PODIATRIC MEDICINE AND SURGERY  3/11/2024    PCP: Dr. Marvin, Primary Doctor    CHIEF COMPLAINT   Chief Complaint   Patient presents with    Nail Problem     C/o black spot on the left great toe, x 1 week, 0 pain, c/o 5th toe pain, left foot, x 2 months, 0 injury, rates pain 7/10, non-diabetic, wears casual shoes and socks       HPI:    Zohra Pickett is a 83 y.o. female who has a past medical history of Hypothyroidism.   Zohra presents to clinic today complaining of  pain along 5th metatarsal appears symptoms are intermittent and mostly painful in certain shoe wear.  She also admits a small black spot on great toe.  She denies any pain at night.    Patient denies other pedal complaints at this time.     PMH  Past Medical History:   Diagnosis Date    Hypothyroidism        PROBLEM LIST  There are no problems to display for this patient.      MEDS  Current Outpatient Medications on File Prior to Visit   Medication Sig Dispense Refill    calcium carbonate-magnesium hydroxide (ROLAIDS) 550-110 mg Chew Take 1 tablet by mouth.      liothyronine (CYTOMEL) 5 MCG Tab Take 5 mcg by mouth.      metFORMIN (GLUCOPHAGE) 500 MG tablet Take 500 mg by mouth.      olmesartan (BENICAR) 5 MG Tab Take by mouth.      SYNTHROID 50 mcg tablet Take 50 mcg by mouth.      citalopram (CELEXA) 20 MG tablet Take 1 tablet by mouth once daily.       No current facility-administered medications on file prior to visit.       Medication List with Changes/Refills   New Medications    MELOXICAM (MOBIC) 7.5 MG TABLET    Take 1 tablet (7.5 mg total) by mouth once daily. for 10 days   Current Medications    CALCIUM CARBONATE-MAGNESIUM HYDROXIDE (ROLAIDS) 550-110 MG CHEW    Take 1 tablet by mouth.    CITALOPRAM (CELEXA) 20 MG TABLET    Take 1 tablet by mouth once daily.    LIOTHYRONINE (CYTOMEL) 5 MCG TAB    Take 5 mcg by mouth.    METFORMIN (GLUCOPHAGE) 500 MG TABLET    Take 500 mg by mouth.    OLMESARTAN (BENICAR) 5 MG TAB    Take by mouth.    SYNTHROID 50  "MCG TABLET    Take 50 mcg by mouth.       PSH   History reviewed. No pertinent surgical history.     ALL  Review of patient's allergies indicates:   Allergen Reactions    Egg derived Anaphylaxis    Ciprofloxacin Other (See Comments)    Pravastatin Other (See Comments)     DVT       SOC     Social History     Tobacco Use    Smoking status: Never    Smokeless tobacco: Never         FAMILY HX  History reviewed. No pertinent family history.         REVIEW OF SYSTEMS  General: This patient is well-developed, well-nourished and appears stated age, well-oriented to person, place and time, and cooperative and pleasant on today's visit  Constitutional: Negative for chills and fever.   Respiratory: Negative for shortness of breath.    Cardiovascular: Negative for chest pain, palpitations, orthopnea  Gastrointestinal: Negative for diarrhea, nausea and vomiting.   Musculoskeletal: Positive for above noted in HPI  Skin: positive  for skin and/or nail changes   Neurological: neg for tingling and sensory changes  Peripheral Vascular: no claudication or cyanosis  Psychiatric/Behavioral: Negative for altered mental status     PHYSICAL EXAM:      Vitals:    03/11/24 1311   Weight: 62.6 kg (138 lb)   Height: 5' 4" (1.626 m)   PainSc:   7         LOWER EXTREMITY PHYSICAL EXAM      DERMATOLOGIC  The is small subungual hematoma on distal nail plate hallux     ORTHOPEDIC/BIOMECHANICAL  No symptomatic structural abnormalities noted. Linnea argueta left. Muscle strength AT/EHL/EDL/PT: 5/5; Achilles/Gastroc/Soleus: 5/5; PB/PL: 5/5 Muscle tone is normal. Ankle joint ROM non painful with DF/PF, non-crepitus;           ASSESSMENT     Encounter Diagnosis   Name Primary?    Ulices argueta, left Yes         PLAN  Patient was educated about clinical and imaging findings, and verbalizes understanding of above.     Diagnoses and all orders for this visit:  Ulices argueta, left    Other orders  -     meloxicam (MOBIC) 7.5 MG tablet; Take " 1 tablet (7.5 mg total) by mouth once daily. for 10 days  Dispense: 10 tablet; Refill: 0        Reviewed findings and explained condition to the patient with visual reference to the foot regarding her tailor's bunion. I explained that sometimes surgical correction maybe necessary, but conservative measure should be attempted first. Off-loading adhesive pads were dispensed to the patient to apply directly to the areas of increased pressure and recommendations were made for wider toe box shoes. The patient was given information on ordering additional pads and also guided on over the counter anti inflammatories for pain and inflammation to be discontinued if any stomach irritation occurs.       Disclaimer:  This note may have been prepared using voice recognition software, it may have not been extensively proofed, as such there could be errors within the text such as sound alike errors.         No future appointments.    Report Electronically Signed By:     Shonan Medina DPM   Podiatry  [facility] [place]  3/11/2024